# Patient Record
Sex: FEMALE | Race: WHITE | NOT HISPANIC OR LATINO | Employment: FULL TIME | URBAN - METROPOLITAN AREA
[De-identification: names, ages, dates, MRNs, and addresses within clinical notes are randomized per-mention and may not be internally consistent; named-entity substitution may affect disease eponyms.]

---

## 2019-06-17 ENCOUNTER — HOSPITAL PATHOLOGY (OUTPATIENT)
Dept: OTHER | Facility: CLINIC | Age: 44
End: 2019-06-17

## 2019-06-18 LAB — COPATH REPORT: NORMAL

## 2020-12-01 ENCOUNTER — TRANSFERRED RECORDS (OUTPATIENT)
Dept: HEALTH INFORMATION MANAGEMENT | Facility: CLINIC | Age: 45
End: 2020-12-01

## 2020-12-01 LAB — PHQ9 SCORE: 0

## 2021-01-08 ENCOUNTER — VIRTUAL VISIT (OUTPATIENT)
Dept: OTOLARYNGOLOGY | Facility: OTHER | Age: 46
End: 2021-01-08
Attending: NURSE PRACTITIONER
Payer: COMMERCIAL

## 2021-01-08 ENCOUNTER — TELEPHONE (OUTPATIENT)
Dept: ALLERGY | Facility: OTHER | Age: 46
End: 2021-01-08

## 2021-01-08 DIAGNOSIS — J30.89 PERENNIAL ALLERGIC RHINITIS: Primary | ICD-10-CM

## 2021-01-08 DIAGNOSIS — J30.81 ALLERGIC RHINITIS DUE TO ANIMAL (CAT) (DOG) HAIR AND DANDER: ICD-10-CM

## 2021-01-08 PROCEDURE — 99213 OFFICE O/P EST LOW 20 MIN: CPT | Mod: TEL | Performed by: NURSE PRACTITIONER

## 2021-01-08 RX ORDER — MONTELUKAST SODIUM 10 MG/1
10 TABLET ORAL AT BEDTIME
COMMUNITY
End: 2023-02-13

## 2021-01-08 RX ORDER — DIMENHYDRINATE 50 MG
1 TABLET ORAL DAILY
COMMUNITY

## 2021-01-08 RX ORDER — ESCITALOPRAM OXALATE 20 MG/1
20 TABLET ORAL DAILY
COMMUNITY

## 2021-01-08 RX ORDER — LACTOBACILLUS RHAMNOSUS GG 10B CELL
1 CAPSULE ORAL 3 TIMES DAILY
COMMUNITY

## 2021-01-08 RX ORDER — CHLORAL HYDRATE 500 MG
2 CAPSULE ORAL 2 TIMES DAILY
COMMUNITY

## 2021-01-08 RX ORDER — MULTIVITAMIN,THERAPEUTIC
1 TABLET ORAL DAILY
COMMUNITY

## 2021-01-08 RX ORDER — LEVOTHYROXINE SODIUM 25 UG/1
25 TABLET ORAL DAILY
COMMUNITY

## 2021-01-08 NOTE — TELEPHONE ENCOUNTER
Went over instructions with patient for allergy skin testing.  Reviewed patients current medications and patient will avoid all contraindicated medications prior to MQT testing.  Patient verbalizes understanding.  Copy of allergy testing packet will be mailed to patient.  Test and follow-up are scheduled, and patient is notified to call [facility] with any questions prior to testing.     Mona Dumont RN

## 2021-01-08 NOTE — LETTER
1/8/2021         RE: Carlee Jones  [address]  [place] [address]        Dear Colleague,    Thank you for referring your patient, Carlee Jones, to the [facility]. Please see a copy of my visit note below.    Carlee is a 45 year old who is being evaluated via a billable telephone visit.      What phone number would you like to be contacted at? [phone]    How would you like to obtain your AVS? MyChart       Otolaryngology Note         Chief Complaint:     Patient presents with:  Allergy Consult           History of Present Illness:     Carlee is a 45 year old female who I am seeing via telephone visit for allergy consult.     She reports she completed allergy testing many years ago, [facility] was completed [facility]. She was allergic to cats and dogs.  She recently got 2 cats and has noticed an increase in nasal congestion, PND, wet cough.  These [place]   She has been using allergy montelukast daily for allergy.  She has not been taking any additional AH.  She tries to keep cats out of the bedroom but does state that they do cuddle with her  on the bed at times.      She has occasional sinus headache when she feels congested.  No recent sinusisits.      She reports is able to breath through nose.   She reports is able to smell and taste.  + liset PND.  No dysphagia.  No fevers or chills.  Appetite is normal. She reports occasional shortness of breath with increased congestion.  She also does frequent throat clearing.  She just ordered netti pot.  She will start using that twice per day.      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 There is no water or  mold.  There is carpet in the bedroom.    Heat source in home: forced air and wood heat  Pets: 2 cats, currently looking for a dog          Medications:     Current Outpatient Rx   Medication Sig Dispense Refill     cholecalciferol (VITAMIN D3) 125 mcg (5000 units) capsule Take by mouth daily       escitalopram (LEXAPRO) 20 MG tablet Take 20 mg by mouth daily       fish oil-omega-3 fatty acids 1000 MG capsule Take 2 g by mouth 2 times daily       Flaxseed, Linseed, (FLAX SEED OIL) 1000 MG capsule Take 1 capsule by mouth daily       lactobacillus rhamnosus, GG, (CULTURELL) capsule Take 1 capsule by mouth 2 times daily       levothyroxine (SYNTHROID/LEVOTHROID) 25 MCG tablet Take 25 mcg by mouth daily       montelukast (SINGULAIR) 10 MG tablet Take 10 mg by mouth At Bedtime       multivitamin, therapeutic (THERA-VIT) TABS tablet Take 1 tablet by mouth daily       omeprazole (PRILOSEC) 20 MG DR capsule Take 20 mg by mouth every other day              Allergies:     Allergies: Cats, Dogs, Neosporin [neomycin-polymyx-gramicid], Penicillins, and Sulfa drugs          Past Medical History:     No past medical history on file.         Past Surgical History:     No past surgical history on file.    ENT family history reviewed         Social History:     Social History     Tobacco Use     Smoking status: Not on file   Substance Use Topics     Alcohol use: Not on file     Drug use: Not on file            Review of Systems:     ROS: See HPI         Physical Exam:     General - The patient is alert and oriented to person and place, answers questions and cooperates with telephone appointment  appropriately.   Voice and Breathing - The patient was talking in full sentences without audible signs of shortness of breath.  There was no audible wheezing, stridor, or stertor.  The patients voice was clear and strong, and had appropriate pitch and quality.           Assessment and Plan:       ICD-10-CM    1. Perennial allergic rhinitis   J30.89    2. Allergic rhinitis due to animal (cat) (dog) hair and dander  J30.81     Personal history per previous testing     Start saline rinses (netti pot) 1-2 times per day  Consider starting daily antihistamine (claritn, zyrtec, allegra or generic equivalent) this will have to be stopped 7 days prior to testing  Continue singulair  Follow up for allergy testing, I will see you after testing.      Indications for allergy testing include:    1) Confirm suspicion of allergic rhinitis due to inhalant allergies  2) Identify the offending allergen to determine specific mode of treatment  3) In the case of chronic rhinosinusitis: when symptoms are not controlled by avoidance and pharmacotherapy  4) In the Asthma patient when exacerbations may be due to perennial allergen exposure  5) Suspect food allergy  6) Otitis Media, chronic rhinitis, atopic dermatitis, Meniere disease, headache, pharyngitis or eye symptoms      Modified quantitative testing (MQT) will be performed.  Signed consent was obtained, and the risks of immunotherapy were discussed, including the potential for anaphylaxis.     If immunotherapy (IT) is recommended, there is continued risk of anaphylaxis.   Anaphylaxis can cause death. The patient will need to be monitored for 30 minutes post injection.  They must present their epinephrine pen prior to injection.  Subcutaneous as well as sublingual immunotherapy (SLIT) were discussed as potential treatment options.  The patient was told SLIT is not approved by the FDA and is cash pay.  The general time frame of immunotherapy was discussed (generally 3-5 years, sometimes longer), and the basic immunology behind IT was discussed.      Trisha CHEEK  [facility]        Phone call duration: 15 minutes      Again, thank you for allowing me to participate in the care of your patient.        Sincerely,        Trisha Francisco NP

## 2021-01-08 NOTE — PATIENT INSTRUCTIONS
Start saline rinses (netti pot) 1-2 times per day  Consider starting daily antihistamine (claritn, zyrtec, allegra or generic equivalent) this will have to be stopped 7 days prior to testing  Continue singulair  Follow up for allergy testing      Thank you for allowing Trisha CHEEK and our ENT team to participate in your care.  If your medications are too expensive, please call my nurse at the number listed below.  We can possibly change this medication.    If you have a scheduling or an appointment question please contact our Health Unit Coordinator at their direct line [phone].   ALL nursing questions or concerns can be directed to your ENT nurses at: [phone] or [phone].  '

## 2021-01-08 NOTE — PROGRESS NOTES
Carlee is a 45 year old who is being evaluated via a billable telephone visit.      What phone number would you like to be contacted at? [phone]    How would you like to obtain your AVS? [facility]       Otolaryngology Note         Chief Complaint:     Patient presents with:  Allergy Consult           History of Present Illness:     Carlee is a 45 year old female who I am seeing via telephone visit for allergy consult.     She reports she completed allergy testing many years ago, testing was completed at [facility]. She was allergic to cats and dogs.  She recently got 2 cats and has noticed an increase in nasal congestion, PND, wet cough.  These [place]   She has been using allergy montelukast daily for allergy.  She has not been taking any additional AH.  She tries to keep cats out of the bedroom but does state that they do cuddle with her  on the bed at times.      She has occasional sinus headache when she feels congested.  No recent sinusisits.      She reports is able to breath through nose.   She reports is able to smell and taste.  + liset PND.  No dysphagia.  No fevers or chills.  Appetite is normal. She reports occasional shortness of breath with increased congestion.  She also does frequent throat clearing.  She just ordered netti pot.  She will start using that twice per day.      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 There is no water or mold.  There is carpet in the bedroom.    Heat source in home: forced air and wood heat  Pets: 2 cats, currently looking for a dog          Medications:     Current Outpatient Rx   Medication Sig Dispense Refill     cholecalciferol (VITAMIN D3)  125 mcg (5000 units) capsule Take by mouth daily       escitalopram (LEXAPRO) 20 MG tablet Take 20 mg by mouth daily       fish oil-omega-3 fatty acids 1000 MG capsule Take 2 g by mouth 2 times daily       Flaxseed, Linseed, (FLAX SEED OIL) 1000 MG capsule Take 1 capsule by mouth daily       lactobacillus rhamnosus, GG, (CULTURELL) capsule Take 1 capsule by mouth 2 times daily       levothyroxine (SYNTHROID/LEVOTHROID) 25 MCG tablet Take 25 mcg by mouth daily       montelukast (SINGULAIR) 10 MG tablet Take 10 mg by mouth At Bedtime       multivitamin, therapeutic (THERA-VIT) TABS tablet Take 1 tablet by mouth daily       omeprazole (PRILOSEC) 20 MG DR capsule Take 20 mg by mouth every other day              Allergies:     Allergies: Cats, Dogs, Neosporin [neomycin-polymyx-gramicid], Penicillins, and Sulfa drugs          Past Medical History:     No past medical history on file.         Past Surgical History:     No past surgical history on file.    ENT family history reviewed         Social History:     Social History     Tobacco Use     Smoking status: Not on file   Substance Use Topics     Alcohol use: Not on file     Drug use: Not on file            Review of Systems:     ROS: See HPI         Physical Exam:     General - The patient is alert and oriented to person and place, answers questions and cooperates with telephone appointment  appropriately.   Voice and Breathing - The patient was talking in full sentences without audible signs of shortness of breath.  There was no audible wheezing, stridor, or stertor.  The patients voice was clear and strong, and had appropriate pitch and quality.           Assessment and Plan:       ICD-10-CM    1. Perennial allergic rhinitis  J30.89    2. Allergic rhinitis due to animal (cat) (dog) hair and dander  J30.81     Personal history per previous testing     Start saline rinses (netti pot) 1-2 times per day  Consider starting daily antihistamine (claritn, zyrtec, allegra or  generic equivalent) this will have to be stopped 7 days prior to testing  Continue singulair  Follow up for allergy testing, I will see you after testing.      Indications for allergy testing include:    1) Confirm suspicion of allergic rhinitis due to inhalant allergies  2) Identify the offending allergen to determine specific mode of treatment  3) In the case of chronic rhinosinusitis: when symptoms are not controlled by avoidance and pharmacotherapy  4) In the Asthma patient when exacerbations may be due to perennial allergen exposure  5) Suspect food allergy  6) Otitis Media, chronic rhinitis, atopic dermatitis, Meniere disease, headache, pharyngitis or eye symptoms      Modified quantitative testing (MQT) will be performed.  Signed consent was obtained, and the risks of immunotherapy were discussed, including the potential for anaphylaxis.     If immunotherapy (IT) is recommended, there is continued risk of anaphylaxis.   Anaphylaxis can cause death. The patient will need to be monitored for 30 minutes post injection.  They must present their epinephrine pen prior to injection.  Subcutaneous as well as sublingual immunotherapy (SLIT) were discussed as potential treatment options.  The patient was told SLIT is not approved by the FDA and is cash pay.  The general time frame of immunotherapy was discussed (generally 3-5 years, sometimes longer), and the basic immunology behind IT was discussed.      Trisha CHEEK  [facility]        Phone call duration: 15 minutes

## 2021-02-09 ENCOUNTER — OFFICE VISIT (OUTPATIENT)
Dept: OTOLARYNGOLOGY | Facility: OTHER | Age: 46
End: 2021-02-09
Attending: NURSE PRACTITIONER
Payer: COMMERCIAL

## 2021-02-09 ENCOUNTER — TELEPHONE (OUTPATIENT)
Dept: ALLERGY | Facility: OTHER | Age: 46
End: 2021-02-09

## 2021-02-09 ENCOUNTER — OFFICE VISIT (OUTPATIENT)
Dept: ALLERGY | Facility: OTHER | Age: 46
End: 2021-02-09
Attending: NURSE PRACTITIONER
Payer: COMMERCIAL

## 2021-02-09 VITALS
DIASTOLIC BLOOD PRESSURE: 65 MMHG | SYSTOLIC BLOOD PRESSURE: 116 MMHG | HEIGHT: 61 IN | HEART RATE: 72 BPM | BODY MASS INDEX: 40.59 KG/M2 | WEIGHT: 215 LBS | TEMPERATURE: 97.8 F | OXYGEN SATURATION: 97 %

## 2021-02-09 DIAGNOSIS — J30.89 PERENNIAL ALLERGIC RHINITIS: Primary | ICD-10-CM

## 2021-02-09 PROCEDURE — 95004 PERQ TESTS W/ALRGNC XTRCS: CPT

## 2021-02-09 PROCEDURE — 95024 IQ TESTS W/ALLERGENIC XTRCS: CPT

## 2021-02-09 PROCEDURE — 99212 OFFICE O/P EST SF 10 MIN: CPT | Mod: 25 | Performed by: NURSE PRACTITIONER

## 2021-02-09 RX ORDER — EPINEPHRINE 0.3 MG/.3ML
0.3 INJECTION SUBCUTANEOUS
Qty: 2 EACH | Refills: 11 | Status: SHIPPED | OUTPATIENT
Start: 2021-02-09

## 2021-02-09 RX ORDER — LEVOCETIRIZINE DIHYDROCHLORIDE 5 MG/1
5 TABLET, FILM COATED ORAL EVERY EVENING
Qty: 90 TABLET | Refills: 3 | Status: SHIPPED | OUTPATIENT
Start: 2021-02-09 | End: 2023-02-13 | Stop reason: ALTCHOICE

## 2021-02-09 ASSESSMENT — PAIN SCALES - GENERAL: PAINLEVEL: NO PAIN (0)

## 2021-02-09 ASSESSMENT — MIFFLIN-ST. JEOR: SCORE: 1547.61

## 2021-02-09 NOTE — PATIENT INSTRUCTIONS
Start allergy shots  The allergy nurses will call you to schedule your safety vial test  Bring your epi pen in with you for each injection and keep with you at all times  Start xyzal daily  You can try stopping the singulair, if symptoms worsen start taking again    Follow up in 6 months for recheck      Thank you for allowing Trisha CHEEK and our ENT team to participate in your care.  If your medications are too expensive, please call my nurse at the number listed below.  We can possibly change this medication.    If you have a scheduling or an appointment question please contact our Health Unit Coordinator at their direct line [phone].   ALL nursing questions or concerns can be directed to your ENT nurses at: [phone] or [phone].

## 2021-02-09 NOTE — PROGRESS NOTES
Carlee was seen for allergy skin testing. Patient was seen by this nurse in conjunction with ENT provider. All encounter details are documented in ENT Provider's appointment from this same date. Please see referenced encounter for this visits documentation.   Mona Dumont RN

## 2021-02-09 NOTE — LETTER
2/9/2021         RE: Carlee Jones  [address]  [place] [address]        Dear Colleague,    Thank you for referring your patient, Carlee Jones, to the [facility]. Please see a copy of my visit note below.    Otolaryngology Note         Chief Complaint:     Patient presents with:  Allergies: MQT           History of Present Illness:     Carlee Jones is a 45 year old female seen today for follow up MQT.      She did well with allergy testing.  No oral swelling, excessive itching, or wheezing.      MQT completed 2/9/21:  Dilution 6 (high) none  Dilution 5 (moderate) cat  Dilution 2 (low) ragweed, pigweed, thistle, grass, birch, maple, elm, oak, mirian, pine, walnut, alternaria, aspergillus, hormodendrum, Epicoccum, fusarium, Helminthosporium, dog, dust    She interested in SCIT         Medications:     Current Outpatient Rx   Medication Sig Dispense Refill     cholecalciferol (VITAMIN D3) 125 mcg (5000 units) capsule Take by mouth daily       EPINEPHrine (ANY BX GENERIC EQUIV) 0.3 MG/0.3ML injection 2-pack Inject 0.3 mLs (0.3 mg) into the muscle once as needed 2 each 11     escitalopram (LEXAPRO) 20 MG tablet Take 20 mg by mouth daily       fish oil-omega-3 fatty acids 1000 MG capsule Take 2 g by mouth 2 times daily       Flaxseed, Linseed, (FLAX SEED OIL) 1000 MG capsule Take 1 capsule by mouth daily       lactobacillus rhamnosus, GG, (CULTURELL) capsule Take 1 capsule by mouth 3 times daily        levothyroxine (SYNTHROID/LEVOTHROID) 25 MCG tablet Take 25 mcg by mouth daily       montelukast (SINGULAIR) 10 MG tablet Take 10 mg by mouth At Bedtime       multivitamin, therapeutic (THERA-VIT) TABS tablet Take 1 tablet by mouth daily              Allergies:     Allergies: Cats, Dog epithelium, Gramicidin, Neomycin, Neosporin [neomycin-polymyx-gramicid], Penicillins, Polymyxin b, and Sulfa drugs          Past Medical History:     History reviewed. No pertinent past medical history.       "   Past Surgical History:     Past Surgical History:   Procedure Laterality Date     HYSTERECTOMY  2019       ENT family history reviewed         Social History:     Social History     Tobacco Use     Smoking status: Former Smoker     Packs/day: 2.00     Types: Cigarettes     Start date: 1986     Quit date: 8/3/1998     Years since quittin.5     Smokeless tobacco: Never Used   Substance Use Topics     Alcohol use: Yes     Comment: social     Drug use: Never            Review of Systems:     ROS: See HPI         Physical Exam:     /65 (BP Location: Right arm, Patient Position: Chair, Cuff Size: Adult Large)   Pulse 72   Temp 97.8  F (36.6  C) (Oral)   Ht 1.549 m (5' 1\")   Wt 97.5 kg (215 lb)   SpO2 97%   BMI 40.62 kg/m      General - The patient is well nourished and well developed, and appears to have good nutritional status.  Alert and oriented to person and place, answers questions and cooperates with examination appropriately.   Head and Face - Normocephalic and atraumatic, with no gross asymmetry noted.  The facial nerve is intact, with strong symmetric movements.  Voice and Breathing - The patient was breathing comfortably without the use of accessory muscles. There was no wheezing, stridor. The patients voice was clear and strong, and had appropriate pitch and quality.  Ears - External ear normal. Canals are patent. Right tympanic membrane is intact without effusion, retraction or mass. Left tympanic membrane is intact without effusion, retraction or mass.  Eyes - Extraocular movements intact, sclera were not icteric or injected, conjunctiva were pink and moist.  Mouth - Examination of the oral cavity showed pink, healthy oral mucosa. Dentition in good condition. No lesions or ulcerations noted. The tongue was mobile and midline.   Throat - The walls of the oropharynx were smooth, pink, moist, symmetric, and had no lesions or ulcerations.  The tonsillar pillars and soft palate were " symmetric. The uvula was midline on elevation.    Neck - Normal midline excursion of the laryngotracheal complex during swallowing.  Full range of motion on passive movement.  Palpation of the occipital, submental, submandibular, internal jugular chain, and supraclavicular nodes did not demonstrate any abnormal lymph nodes or masses.  Palpation of the thyroid was soft and smooth, with no nodules or goiter appreciated.  The trachea was mobile and midline.  Nose - External contour is symmetric, no gross deflection or scars.  Nasal mucosa is pink and moist with no abnormal mucus.  The septum and turbinates were evaluated with nasal speculum, no polyps, masses, or purulence noted on examination.         Assessment and Plan:       ICD-10-CM    1. Perennial allergic rhinitis  J30.89 EPINEPHrine (ANY BX GENERIC EQUIV) 0.3 MG/0.3ML injection 2-pack     Start allergy shots  The allergy nurses will call you to schedule your safety vial test  Bring your epi pen in with you for each injection and keep with you at all times  Start xyzal daily  You can try stopping the singulair, if symptoms worsen start taking again    Follow up in 6 months for recheck, if immunotherapy is going well and no liset concerns, can do virtual visit.          Again, thank you for allowing me to participate in the care of your patient.        Sincerely,        Trisha Francisco NP

## 2021-02-09 NOTE — NURSING NOTE
"Chief Complaint   Patient presents with     Allergies     MQT       Initial /65 (BP Location: Right arm, Patient Position: Chair, Cuff Size: Adult Large)   Pulse 72   Temp 97.8  F (36.6  C) (Oral)   Ht 1.549 m (5' 1\")   Wt 97.5 kg (215 lb)   SpO2 97%   BMI 40.62 kg/m   Estimated body mass index is 40.62 kg/m  as calculated from the following:    Height as of this encounter: 1.549 m (5' 1\").    Weight as of this encounter: 97.5 kg (215 lb).  Medication Reconciliation: complete  Mona Dumont RN    This patient presents today for allergy skin testing.      Symptoms have included nasal congestion, sneezing, runny nose, a wet cough, and PND.  She has occasional itchy, watery eyes.  She rarely gets sinus infections.  No skin issues and no history of asthma.  She had her tonsils removed when she was 12, which she notes was very helpful as she had frequent strep throat.  She notes bad ear infections in the past, but states it has been about 10 years since her last ear infection.  No tubes.  Symptoms are worse in the fall season.  She occasionally uses a netti pot, which she states is helpful.      This patient lives in a single family home, with a basement.  This patient does not suspect mold, water or moisture issues in the home.  There is carpet in the home, and carpet in the bedroom.  Home has wood stove and propane forced air heat and does not have air conditioning.        This patient has 2 cats for pets.  They are both inside.  They do sleep in bed with the patient.    This patient has had allergy testing in the past.  She was tested some years back in [place].  She recalls positives to cats and dogs.  No past IT.    This patient's medications have been reviewed prior to testing and all appropriate medications have been stopped.    Consent is signed by patient and signature is verified.     MQT/ID test is performed per protocol.  The patient tolerated testing well.  Benadryl gel was applied to " testing sites on patient's skin, and patient received Claritin 10 mg chewable tablets, both per standing order for post test itch.  All findings are recorded on the paper flow sheet. Results are reviewed with this patient.  They are given written information regarding allergy.       The patient will follow-up with Trisha Francisco CNP, for treatment plan.      Mona Dumont RN

## 2021-02-09 NOTE — PROGRESS NOTES
Otolaryngology Note         Chief Complaint:     Patient presents with:  Allergies: MQT           History of Present Illness:     Carlee Jones is a 45 year old female seen today for follow up MQT.      She did well with allergy testing.  No oral swelling, excessive itching, or wheezing.      MQT completed 2/9/21:  Dilution 6 (high) none  Dilution 5 (moderate) cat  Dilution 2 (low) ragweed, pigweed, thistle, grass, birch, maple, elm, oak, mirian, pine, walnut, alternaria, aspergillus, hormodendrum, Epicoccum, fusarium, Helminthosporium, dog, dust    She interested in SCIT         Medications:     Current Outpatient Rx   Medication Sig Dispense Refill     cholecalciferol (VITAMIN D3) 125 mcg (5000 units) capsule Take by mouth daily       EPINEPHrine (ANY BX GENERIC EQUIV) 0.3 MG/0.3ML injection 2-pack Inject 0.3 mLs (0.3 mg) into the muscle once as needed 2 each 11     escitalopram (LEXAPRO) 20 MG tablet Take 20 mg by mouth daily       fish oil-omega-3 fatty acids 1000 MG capsule Take 2 g by mouth 2 times daily       Flaxseed, Linseed, (FLAX SEED OIL) 1000 MG capsule Take 1 capsule by mouth daily       lactobacillus rhamnosus, GG, (CULTURELL) capsule Take 1 capsule by mouth 3 times daily        levothyroxine (SYNTHROID/LEVOTHROID) 25 MCG tablet Take 25 mcg by mouth daily       montelukast (SINGULAIR) 10 MG tablet Take 10 mg by mouth At Bedtime       multivitamin, therapeutic (THERA-VIT) TABS tablet Take 1 tablet by mouth daily              Allergies:     Allergies: Cats, Dog epithelium, Gramicidin, Neomycin, Neosporin [neomycin-polymyx-gramicid], Penicillins, Polymyxin b, and Sulfa drugs          Past Medical History:     History reviewed. No pertinent past medical history.         Past Surgical History:     Past Surgical History:   Procedure Laterality Date     HYSTERECTOMY  06/2019       ENT family history reviewed         Social History:     Social History     Tobacco Use     Smoking status: Former Smoker      "Packs/day: 2.00     Types: Cigarettes     Start date: 1986     Quit date: 8/3/1998     Years since quittin.5     Smokeless tobacco: Never Used   Substance Use Topics     Alcohol use: Yes     Comment: social     Drug use: Never            Review of Systems:     ROS: See HPI         Physical Exam:     /65 (BP Location: Right arm, Patient Position: Chair, Cuff Size: Adult Large)   Pulse 72   Temp 97.8  F (36.6  C) (Oral)   Ht 1.549 m (5' 1\")   Wt 97.5 kg (215 lb)   SpO2 97%   BMI 40.62 kg/m      General - The patient is well nourished and well developed, and appears to have good nutritional status.  Alert and oriented to person and place, answers questions and cooperates with examination appropriately.   Head and Face - Normocephalic and atraumatic, with no gross asymmetry noted.  The facial nerve is intact, with strong symmetric movements.  Voice and Breathing - The patient was breathing comfortably without the use of accessory muscles. There was no wheezing, stridor. The patients voice was clear and strong, and had appropriate pitch and quality.  Ears - External ear normal. Canals are patent. Right tympanic membrane is intact without effusion, retraction or mass. Left tympanic membrane is intact without effusion, retraction or mass.  Eyes - Extraocular movements intact, sclera were not icteric or injected, conjunctiva were pink and moist.  Mouth - Examination of the oral cavity showed pink, healthy oral mucosa. Dentition in good condition. No lesions or ulcerations noted. The tongue was mobile and midline.   Throat - The walls of the oropharynx were smooth, pink, moist, symmetric, and had no lesions or ulcerations.  The tonsillar pillars and soft palate were symmetric. The uvula was midline on elevation.    Neck - Normal midline excursion of the laryngotracheal complex during swallowing.  Full range of motion on passive movement.  Palpation of the occipital, submental, submandibular, internal " jugular chain, and supraclavicular nodes did not demonstrate any abnormal lymph nodes or masses.  Palpation of the thyroid was soft and smooth, with no nodules or goiter appreciated.  The trachea was mobile and midline.  Nose - External contour is symmetric, no gross deflection or scars.  Nasal mucosa is pink and moist with no abnormal mucus.  The septum and turbinates were evaluated with nasal speculum, no polyps, masses, or purulence noted on examination.         Assessment and Plan:       ICD-10-CM    1. Perennial allergic rhinitis  J30.89 EPINEPHrine (ANY BX GENERIC EQUIV) 0.3 MG/0.3ML injection 2-pack     Start allergy shots  The allergy nurses will call you to schedule your safety vial test  Bring your epi pen in with you for each injection and keep with you at all times  Start xyzal daily  You can try stopping the singulair, if symptoms worsen start taking again    Follow up in 6 months for recheck, if immunotherapy is going well and no liset concerns, can do virtual visit.

## 2021-02-24 ENCOUNTER — ALLIED HEALTH/NURSE VISIT (OUTPATIENT)
Dept: ALLERGY | Facility: OTHER | Age: 46
End: 2021-02-24
Attending: NURSE PRACTITIONER
Payer: COMMERCIAL

## 2021-02-24 DIAGNOSIS — J30.89 PERENNIAL ALLERGIC RHINITIS: Primary | ICD-10-CM

## 2021-02-24 PROCEDURE — 95165 ANTIGEN THERAPY SERVICES: CPT | Performed by: NURSE PRACTITIONER

## 2021-02-24 NOTE — PROGRESS NOTES
Allergy serum is mixed today at schedule Silver 1 of  4,  into  1  (5 ml)  multi dose vial/vials.    Allergens included were:    Ragweed  0.2 ml of dilution # 2  Pigweed  0.2 ml of dilution # 2  Mugwort 0.2 ml of dilution # 0  Kochia  0.2 ml of dilution # 0  Russian Thistle 0.2 ml of dilution # 2  Que Grass 0.2 ml of dilution # 2  Birch mix 0.2 ml of dilution # 2  Maple Mix 0.2 of dilution # 2  Elm Mix 0.2 ml of dilution # 2  Oak Mix 0.2 ml of dilution # 2  Moe Mix 0.2 ml of dilution # 2  Pine Mix 0.2 ml of dilution # 2  Eastern [place] 0.2 ml of dilution # 0  Black [place] 0.2 ml of dilution # 2  Aspen 0.2 ml of dilution # 0  Red [place] 0.2 ml of dilution # 0    Alternaria 0.2 ml of dilution # 2  Aspergillus 0.2 ml of dilution # 2  Hormodendrum 0.2 ml of dilution # 2  Helminthosporium 0.2 ml of dilution # 2  Penicillium 0.2 ml of dilution # 0  Epicoccum 0.2 ml of dilution # 2  Fusarium 0.2 ml of dilution # 2  Mucor 0.2 ml of dilution # 0  Grain Smut 0.2 ml of dilution # 0  Grass Smut 0.2 ml of dilution # 0  Cat 0.2 ml of dilution # 4  Dog 0.2 ml of dilution # 2  Feather Mix 0.2 ml of dilution # 0  Dust Mite Mix 0.2 ml of dilution # 2  Horse 0.2 ml of dilution # 0    Hernando Vanegas RN on 2/24/2021 at 10:34 AM

## 2021-03-04 ENCOUNTER — OFFICE VISIT (OUTPATIENT)
Dept: ALLERGY | Facility: OTHER | Age: 46
End: 2021-03-04
Attending: NURSE PRACTITIONER
Payer: COMMERCIAL

## 2021-03-04 DIAGNOSIS — J30.89 PERENNIAL ALLERGIC RHINITIS: Primary | ICD-10-CM

## 2021-03-04 PROCEDURE — 95115 IMMUNOTHERAPY ONE INJECTION: CPT

## 2021-05-17 ENCOUNTER — TELEPHONE (OUTPATIENT)
Dept: OTOLARYNGOLOGY | Facility: OTHER | Age: 46
End: 2021-05-17

## 2021-05-26 ENCOUNTER — ALLIED HEALTH/NURSE VISIT (OUTPATIENT)
Dept: ALLERGY | Facility: OTHER | Age: 46
End: 2021-05-26
Attending: NURSE PRACTITIONER
Payer: COMMERCIAL

## 2021-05-26 DIAGNOSIS — J30.89 PERENNIAL ALLERGIC RHINITIS: Primary | ICD-10-CM

## 2021-05-26 PROCEDURE — 95165 ANTIGEN THERAPY SERVICES: CPT | Performed by: NURSE PRACTITIONER

## 2021-05-26 NOTE — PROGRESS NOTES
Allergy serum is mixed today at Gold schedule 2 of 4, into one (5 ml) multi dose vial/vials.    Allergens included were:    Ragweed  0.2 ml of dilution # 1  Pigweed  0.2 ml of dilution # 1  Mugwort 0.2 ml of dilution # 0  Kochia  0.2 ml of dilution # 0  Russian Thistle 0.2 ml of dilution # 1  Que Grass 0.2 ml of dilution # 1  Birch mix 0.2 ml of dilution # 1  Maple Mix 0.2 of dilution # 1  Elm Mix 0.2 ml of dilution # 1  Oak Mix 0.2 ml of dilution # 1  Moe Mix 0.2 ml of dilution # 1  Pine Mix 0.2 ml of dilution # 1  Eastern [place] 0.2 ml of dilution # 0  Black [place] 0.2 ml of dilution # 1  Aspen 0.2 ml of dilution # 0  Red [place] 0.2 ml of dilution # 0    Alternaria 0.2 ml of dilution # 1  Aspergillus 0.2 ml of dilution # 1  Hormodendrum 0.2 ml of dilution # 1  Helminthosporium 0.2 ml of dilution # 1  Penicillium 0.2 ml of dilution # 0  Epicoccum 0.2 ml of dilution # 1  Fusarium 0.2 ml of dilution # 1  Mucor 0.2 ml of dilution # 0  Grain Smut 0.2 ml of dilution # 0  Grass Smut 0.2 ml of dilution # 0  Cat 0.2 ml of dilution # 3  Dog 0.2 ml of dilution # 1  Feather Mix 0.2 ml of dilution # 0  Dust Mite Mix 0.2 ml of dilution # 1  Horse 0.2 ml of dilution # 0    Mona Dumont RN

## 2021-06-04 ENCOUNTER — OFFICE VISIT (OUTPATIENT)
Dept: ALLERGY | Facility: OTHER | Age: 46
End: 2021-06-04
Attending: NURSE PRACTITIONER
Payer: COMMERCIAL

## 2021-06-04 DIAGNOSIS — J30.89 PERENNIAL ALLERGIC RHINITIS: Primary | ICD-10-CM

## 2021-06-04 PROCEDURE — 95115 IMMUNOTHERAPY ONE INJECTION: CPT

## 2021-06-04 NOTE — PROGRESS NOTES
Prior to safety vial test, verified patient's identity using patient's name and date of birth.    Safety vial test was done in the right arm, for new Gold vial # 1.   Administered  0.01ml (ID) for SVT.  SVT = 7 mm.   Passed.    Allergy injection/s given and charted on paper allergy flow sheet.  Patient held 30 minutes for observation, no reaction noted.    Mona Dumont RN

## 2021-08-18 ENCOUNTER — ALLIED HEALTH/NURSE VISIT (OUTPATIENT)
Dept: ALLERGY | Facility: OTHER | Age: 46
End: 2021-08-18
Attending: PHYSICIAN ASSISTANT
Payer: COMMERCIAL

## 2021-08-18 ENCOUNTER — TRANSFERRED RECORDS (OUTPATIENT)
Dept: ALLERGY | Facility: OTHER | Age: 46
End: 2021-08-18

## 2021-08-18 DIAGNOSIS — J30.89 PERENNIAL ALLERGIC RHINITIS: Primary | ICD-10-CM

## 2021-08-18 PROCEDURE — 95165 ANTIGEN THERAPY SERVICES: CPT | Performed by: PHYSICIAN ASSISTANT

## 2021-08-18 NOTE — PROGRESS NOTES
Allergy serum is mixed today at schedule Blue 3 of 4,  into  1  (5 ml)  multi dose vial/vials.    Allergens included were:    Ragweed  0.2 ml of dilution # 1  Pigweed  0.2 ml of dilution # 1  Mugwort 0.2 ml of dilution # 0  Kochia  0.2 ml of dilution # 0  Russian Thistle 0.2 ml of dilution # 1  Que Grass 0.2 ml of dilution # 1  Birch mix 0.2 ml of dilution # 1  Maple Mix 0.2 of dilution # 1  Elm Mix 0.2 ml of dilution # 1  Oak Mix 0.2 ml of dilution # 1  Moe Mix 0.2 ml of dilution # 1  Pine Mix 0.2 ml of dilution # 1  Eastern [place] 0.2 ml of dilution # 0  Black [place] 0.2 ml of dilution # 1  Aspen 0.2 ml of dilution # 0  Red [place] 0.2 ml of dilution # 0    Alternaria 0.2 ml of dilution # 1  Aspergillus 0.2 ml of dilution # 1  Hormodendrum 0.2 ml of dilution # 1  Helminthosporium 0.2 ml of dilution # 1  Penicillium 0.2 ml of dilution # 0  Epicoccum 0.2 ml of dilution # 1  Fusarium 0.2 ml of dilution # 1  Mucor 0.2 ml of dilution # 0  Grain Smut 0.2 ml of dilution # 0  Grass Smut 0.2 ml of dilution # 0  Cat 0.2 ml of dilution # 2  Dog 0.2 ml of dilution # 1  Feather Mix 0.2 ml of dilution # 0  Dust Mite Mix 0.2 ml of dilution # 1  Horse 0.2 ml of dilution # 0    Hernando Vanegas RN on 8/18/2021 at 9:38 AM

## 2021-08-25 PROBLEM — J34.2 DEVIATED NASAL SEPTUM: Status: ACTIVE | Noted: 2019-01-04

## 2021-08-25 PROBLEM — R00.2 PALPITATIONS: Status: ACTIVE | Noted: 2019-01-04

## 2021-08-25 PROBLEM — M67.40 GANGLION: Status: ACTIVE | Noted: 2019-01-04

## 2021-08-25 PROBLEM — G47.30 SLEEP APNEA: Status: ACTIVE | Noted: 2019-01-04

## 2021-08-25 PROBLEM — D23.9 BENIGN NEOPLASM OF SKIN: Status: ACTIVE | Noted: 2019-01-04

## 2021-08-25 PROBLEM — M21.619 BUNION: Status: ACTIVE | Noted: 2019-01-04

## 2021-08-25 PROBLEM — K64.9 HEMORRHOIDS, UNSPECIFIED HEMORRHOID TYPE: Status: ACTIVE | Noted: 2019-01-04

## 2021-08-31 ENCOUNTER — OFFICE VISIT (OUTPATIENT)
Dept: OTOLARYNGOLOGY | Facility: OTHER | Age: 46
End: 2021-08-31
Attending: NURSE PRACTITIONER
Payer: COMMERCIAL

## 2021-08-31 ENCOUNTER — OFFICE VISIT (OUTPATIENT)
Dept: ALLERGY | Facility: OTHER | Age: 46
End: 2021-08-31
Attending: NURSE PRACTITIONER
Payer: COMMERCIAL

## 2021-08-31 VITALS
WEIGHT: 208 LBS | HEART RATE: 66 BPM | OXYGEN SATURATION: 97 % | TEMPERATURE: 98.6 F | BODY MASS INDEX: 39.27 KG/M2 | SYSTOLIC BLOOD PRESSURE: 110 MMHG | HEIGHT: 61 IN | DIASTOLIC BLOOD PRESSURE: 70 MMHG

## 2021-08-31 DIAGNOSIS — J30.89 PERENNIAL ALLERGIC RHINITIS: Primary | ICD-10-CM

## 2021-08-31 PROBLEM — E66.01 MORBID OBESITY (H): Status: ACTIVE | Noted: 2021-08-31

## 2021-08-31 PROCEDURE — 95115 IMMUNOTHERAPY ONE INJECTION: CPT

## 2021-08-31 PROCEDURE — 99213 OFFICE O/P EST LOW 20 MIN: CPT | Mod: 25 | Performed by: NURSE PRACTITIONER

## 2021-08-31 RX ORDER — CETIRIZINE HYDROCHLORIDE 10 MG/1
10 TABLET ORAL DAILY
COMMUNITY

## 2021-08-31 ASSESSMENT — PAIN SCALES - GENERAL: PAINLEVEL: NO PAIN (0)

## 2021-08-31 ASSESSMENT — MIFFLIN-ST. JEOR: SCORE: 1510.86

## 2021-08-31 NOTE — NURSING NOTE
"Chief Complaint   Patient presents with     Follow Up     6 month Perennial Allergic Rhinitis        Initial /70 (BP Location: Right arm, Patient Position: Chair, Cuff Size: Adult Large)   Pulse 66   Temp 98.6  F (37  C) (Oral)   Ht 1.549 m (5' 1\")   Wt 94.3 kg (208 lb)   SpO2 97%   BMI 39.30 kg/m   Estimated body mass index is 39.3 kg/m  as calculated from the following:    Height as of this encounter: 1.549 m (5' 1\").    Weight as of this encounter: 94.3 kg (208 lb).  Medication Reconciliation: complete  Mona Dumont RN    "

## 2021-08-31 NOTE — PROGRESS NOTES
Prior to safety vial test, verified patient's identity using patient's name and date of birth.    Safety vial test was done in the left arm, for new Blue vial # 1.   Administered  0.01ml (ID) for SVT.  SVT = 7 mm.   Passed.    Allergy injection/s given and charted on paper allergy flow sheet.  Patient held 30 minutes for observation, no reaction noted.    Mona Dumont RN

## 2021-08-31 NOTE — PROGRESS NOTES
Otolaryngology Note         Chief Complaint:     Patient presents with:  Follow Up: 6 month Perennial Allergic Rhinitis            History of Present Illness:     Carlee Jones is a 46 year old female seen today for a follow up for SCIT.  She is currently on the blue vial of build up.      Has been tolerating injections well  Has noticed improvement with nasal congestion and allergy symptoms.    Has been using zyrtec for symptomatic relief and has noticed relief since starting medication.      Epi pen is up to date    Does not use alice med sinus rinses consistently but has it if needed  No liset sinusitis  No recent illnesses or sinus infections    MQT completed 2/9/21:  Dilution 6 (high) none  Dilution 5 (moderate) cat  Dilution 2 (low) ragweed, pigweed, thistle, grass, birch, maple, elm, oak, mirian, pine, walnut, alternaria, aspergillus, hormodendrum, Epicoccum, fusarium, Helminthosporium, dog, dust         Medications:     Current Outpatient Rx   Medication Sig Dispense Refill     cetirizine (ZYRTEC) 10 MG tablet Take 10 mg by mouth daily       cholecalciferol (VITAMIN D3) 125 mcg (5000 units) capsule Take by mouth daily       escitalopram (LEXAPRO) 20 MG tablet Take 20 mg by mouth daily       fish oil-omega-3 fatty acids 1000 MG capsule Take 2 g by mouth 2 times daily       Flaxseed, Linseed, (FLAX SEED OIL) 1000 MG capsule Take 1 capsule by mouth daily       lactobacillus rhamnosus, GG, (CULTURELL) capsule Take 1 capsule by mouth 3 times daily        levothyroxine (SYNTHROID/LEVOTHROID) 25 MCG tablet Take 25 mcg by mouth daily       multivitamin, therapeutic (THERA-VIT) TABS tablet Take 1 tablet by mouth daily       ORDER FOR ALLERGEN IMMUNOTHERAPY Patient to start allergy shots.  Follow standard buildup protocols. 5 mL PRN     EPINEPHrine (ANY BX GENERIC EQUIV) 0.3 MG/0.3ML injection 2-pack Inject 0.3 mLs (0.3 mg) into the muscle once as needed (Patient not taking: Reported on 8/31/2021) 2 each 11      "levocetirizine (XYZAL) 5 MG tablet Take 1 tablet (5 mg) by mouth every evening (Patient not taking: Reported on 2021) 90 tablet 3     montelukast (SINGULAIR) 10 MG tablet Take 10 mg by mouth At Bedtime (Patient not taking: Reported on 2021)              Allergies:     Allergies: Cats, Dog epithelium, Gramicidin, Neomycin, Neosporin [neomycin-polymyx-gramicid], Penicillins, Polymyxin b, and Sulfa drugs          Past Medical History:     History reviewed. No pertinent past medical history.         Past Surgical History:     Past Surgical History:   Procedure Laterality Date     HERNIA REPAIR Right 2021     HYSTERECTOMY  2019       ENT family history reviewed         Social History:     Social History     Tobacco Use     Smoking status: Former Smoker     Packs/day: 2.00     Types: Cigarettes     Start date: 1986     Quit date: 8/3/1998     Years since quittin.0     Smokeless tobacco: Never Used   Substance Use Topics     Alcohol use: Yes     Comment: social     Drug use: Never            Review of Systems:     ROS: See HPI         Physical Exam:     /70 (BP Location: Right arm, Patient Position: Chair, Cuff Size: Adult Large)   Pulse 66   Temp 98.6  F (37  C) (Oral)   Ht 1.549 m (5' 1\")   Wt 94.3 kg (208 lb)   SpO2 97%   BMI 39.30 kg/m      General - The patient is well nourished and well developed, and appears to have good nutritional status.  Alert and oriented to person and place, answers questions and cooperates with examination appropriately.   Head and Face - Normocephalic and atraumatic, with no gross asymmetry noted.  The facial nerve is intact, with strong symmetric movements.  Voice and Breathing - The patient was breathing comfortably without the use of accessory muscles. There was no wheezing, stridor. The patients voice was clear and strong, and had appropriate pitch and quality.  Ears - External ear normal. Canals are patent. Right tympanic membrane is intact " without effusion, retraction or mass. Left tympanic membrane is intact without effusion, retraction or mass.  Eyes - Extraocular movements intact, and the pupils were reactive to light. Sclera were not icteric or injected, conjunctiva were pink and moist.  Mouth - Examination of the oral cavity showed pink, healthy oral mucosa. Dentition in good condition. No lesions or ulcerations noted. The tongue was mobile and midline.   Throat - The walls of the oropharynx were smooth, pink, moist, symmetric, and had no lesions or ulcerations.  The tonsillar pillars and soft palate were symmetric. The uvula was midline on elevation.    Neck - Normal midline excursion of the laryngotracheal complex during swallowing.  Full range of motion on passive movement.  Palpation of the occipital, submental, submandibular, internal jugular chain, and supraclavicular nodes did not demonstrate any abnormal lymph nodes or masses.  Palpation of the thyroid was soft and smooth, with no nodules or goiter appreciated.  The trachea was mobile and midline.  Nose - External contour is symmetric, no gross deflection or scars.  Nasal mucosa is pink and moist with no abnormal mucus.  The septum and turbinates were evaluated with nasal speculum, no polyps, masses, or purulence noted on examination of anterior nasal cavity.         Assessment and Plan:       ICD-10-CM    1. Perennial allergic rhinitis  J30.89

## 2021-08-31 NOTE — PATIENT INSTRUCTIONS
Continue weekly injections  Follow up in 12 months for recheck, sooner as needed with new or worsening symptoms  Call when you need a new epi pen.        Thank you for allowing Trisha CHEEK and our ENT team to participate in your care.  If your medications are too expensive, please call my nurse at the number listed below.  We can possibly change this medication.    If you have a scheduling or an appointment question please contact our Health Unit Coordinator at their direct line [phone]  ALL nursing questions or concerns can be directed to my Nurse Halley [phone].

## 2021-08-31 NOTE — LETTER
8/31/2021         RE: Carlee Jones  [address]  [place] [address]        Dear Colleague,    Thank you for referring your patient, Carlee Jones, to the [facility]. Please see a copy of my visit note below.    Otolaryngology Note         Chief Complaint:     Patient presents with:  Follow Up: 6 month Perennial Allergic Rhinitis            History of Present Illness:     Carlee Jones is a 46 year old female seen today for a follow up for SCIT.  She is currently on the blue vial of build up.      Has been tolerating injections well  Has noticed improvement with nasal congestion and allergy symptoms.    Has been using zyrtec for symptomatic relief and has noticed relief since starting medication.      Epi pen is up to date    Does not use alice med sinus rinses consistently but has it if needed  No liset sinusitis  No recent illnesses or sinus infections    MQT completed 2/9/21:  Dilution 6 (high) none  Dilution 5 (moderate) cat  Dilution 2 (low) ragweed, pigweed, thistle, grass, birch, maple, elm, oak, mirian, pine, walnut, alternaria, aspergillus, hormodendrum, Epicoccum, fusarium, Helminthosporium, dog, dust         Medications:     Current Outpatient Rx   Medication Sig Dispense Refill     cetirizine (ZYRTEC) 10 MG tablet Take 10 mg by mouth daily       cholecalciferol (VITAMIN D3) 125 mcg (5000 units) capsule Take by mouth daily       escitalopram (LEXAPRO) 20 MG tablet Take 20 mg by mouth daily       fish oil-omega-3 fatty acids 1000 MG capsule Take 2 g by mouth 2 times daily       Flaxseed, Linseed, (FLAX SEED OIL) 1000 MG capsule Take 1 capsule by mouth daily       lactobacillus rhamnosus, GG, (CULTURELL) capsule Take 1 capsule by mouth 3 times daily        levothyroxine (SYNTHROID/LEVOTHROID) 25 MCG tablet Take 25 mcg by mouth daily       multivitamin, therapeutic (THERA-VIT) TABS tablet Take 1 tablet by mouth daily       ORDER FOR ALLERGEN IMMUNOTHERAPY Patient to start  "allergy shots.  Follow standard buildup protocols. 5 mL PRN     EPINEPHrine (ANY BX GENERIC EQUIV) 0.3 MG/0.3ML injection 2-pack Inject 0.3 mLs (0.3 mg) into the muscle once as needed (Patient not taking: Reported on 2021) 2 each 11     levocetirizine (XYZAL) 5 MG tablet Take 1 tablet (5 mg) by mouth every evening (Patient not taking: Reported on 2021) 90 tablet 3     montelukast (SINGULAIR) 10 MG tablet Take 10 mg by mouth At Bedtime (Patient not taking: Reported on 2021)              Allergies:     Allergies: Cats, Dog epithelium, Gramicidin, Neomycin, Neosporin [neomycin-polymyx-gramicid], Penicillins, Polymyxin b, and Sulfa drugs          Past Medical History:     History reviewed. No pertinent past medical history.         Past Surgical History:     Past Surgical History:   Procedure Laterality Date     HERNIA REPAIR Right 2021     HYSTERECTOMY  2019       ENT family history reviewed         Social History:     Social History     Tobacco Use     Smoking status: Former Smoker     Packs/day: 2.00     Types: Cigarettes     Start date: 1986     Quit date: 8/3/1998     Years since quittin.0     Smokeless tobacco: Never Used   Substance Use Topics     Alcohol use: Yes     Comment: social     Drug use: Never            Review of Systems:     ROS: See HPI         Physical Exam:     /70 (BP Location: Right arm, Patient Position: Chair, Cuff Size: Adult Large)   Pulse 66   Temp 98.6  F (37  C) (Oral)   Ht 1.549 m (5' 1\")   Wt 94.3 kg (208 lb)   SpO2 97%   BMI 39.30 kg/m      General - The patient is well nourished and well developed, and appears to have good nutritional status.  Alert and oriented to person and place, answers questions and cooperates with examination appropriately.   Head and Face - Normocephalic and atraumatic, with no gross asymmetry noted.  The facial nerve is intact, with strong symmetric movements.  Voice and Breathing - The patient was breathing " comfortably without the use of accessory muscles. There was no wheezing, stridor. The patients voice was clear and strong, and had appropriate pitch and quality.  Ears - External ear normal. Canals are patent. Right tympanic membrane is intact without effusion, retraction or mass. Left tympanic membrane is intact without effusion, retraction or mass.  Eyes - Extraocular movements intact, and the pupils were reactive to light. Sclera were not icteric or injected, conjunctiva were pink and moist.  Mouth - Examination of the oral cavity showed pink, healthy oral mucosa. Dentition in good condition. No lesions or ulcerations noted. The tongue was mobile and midline.   Throat - The walls of the oropharynx were smooth, pink, moist, symmetric, and had no lesions or ulcerations.  The tonsillar pillars and soft palate were symmetric. The uvula was midline on elevation.    Neck - Normal midline excursion of the laryngotracheal complex during swallowing.  Full range of motion on passive movement.  Palpation of the occipital, submental, submandibular, internal jugular chain, and supraclavicular nodes did not demonstrate any abnormal lymph nodes or masses.  Palpation of the thyroid was soft and smooth, with no nodules or goiter appreciated.  The trachea was mobile and midline.  Nose - External contour is symmetric, no gross deflection or scars.  Nasal mucosa is pink and moist with no abnormal mucus.  The septum and turbinates were evaluated with nasal speculum, no polyps, masses, or purulence noted on examination of anterior nasal cavity.         Assessment and Plan:       ICD-10-CM    1. Perennial allergic rhinitis  J30.89            Again, thank you for allowing me to participate in the care of your patient.        Sincerely,        Trisha Francisco NP

## 2021-11-17 ENCOUNTER — ALLIED HEALTH/NURSE VISIT (OUTPATIENT)
Dept: ALLERGY | Facility: OTHER | Age: 46
End: 2021-11-17
Attending: NURSE PRACTITIONER
Payer: COMMERCIAL

## 2021-11-17 DIAGNOSIS — J30.89 PERENNIAL ALLERGIC RHINITIS: Primary | ICD-10-CM

## 2021-11-17 PROCEDURE — 95165 ANTIGEN THERAPY SERVICES: CPT | Performed by: NURSE PRACTITIONER

## 2021-11-17 NOTE — PROGRESS NOTES
Allergy serum is mixed today at Green schedule 4 of 4,  into  1  (5 ml)  multi dose vial/vials.    Allergens included were:    Ragweed  0.2 ml of dilution # 1  Pigweed  0.2 ml of dilution # 1  Mugwort 0.2 ml of dilution # 0  Kochia  0.2 ml of dilution # 0  Russian Thistle 0.2 ml of dilution # 1  Que Grass 0.2 ml of dilution # 1  Birch mix 0.2 ml of dilution # 1  Maple Mix 0.2 of dilution # 1  Elm Mix 0.2 ml of dilution # 1  Oak Mix 0.2 ml of dilution # 1  Moe Mix 0.2 ml of dilution # 1  Pine Mix 0.2 ml of dilution # 1  Eastern [place] 0.2 ml of dilution # 0  Black [place] 0.2 ml of dilution # 1  Aspen 0.2 ml of dilution # 0  Red [place] 0.2 ml of dilution # 0    Alternaria 0.2 ml of dilution # 1  Aspergillus 0.2 ml of dilution # 1  Hormodendrum 0.2 ml of dilution # 1  Helminthosporium 0.2 ml of dilution # 1  Penicillium 0.2 ml of dilution # 0  Epicoccum 0.2 ml of dilution # 1  Fusarium 0.2 ml of dilution # 1  Mucor 0.2 ml of dilution # 0  Grain Smut 0.2 ml of dilution # 0  Grass Smut 0.2 ml of dilution # 0  Cat 0.2 ml of dilution # 1  Dog 0.2 ml of dilution # 1  Feather Mix 0.2 ml of dilution # 0  Dust Mite Mix 0.2 ml of dilution # 1  Horse 0.2 ml of dilution # 0    Hernando Vanegas RN on 11/17/2021 at 1:45 PM

## 2021-11-23 ENCOUNTER — OFFICE VISIT (OUTPATIENT)
Dept: ALLERGY | Facility: OTHER | Age: 46
End: 2021-11-23
Attending: PHYSICIAN ASSISTANT
Payer: COMMERCIAL

## 2021-11-23 DIAGNOSIS — J30.89 PERENNIAL ALLERGIC RHINITIS: Primary | ICD-10-CM

## 2021-11-23 PROCEDURE — 95115 IMMUNOTHERAPY ONE INJECTION: CPT

## 2021-11-23 NOTE — PROGRESS NOTES
Prior to injection patient identity verified using name and date of birth.    SVT done on left arm, measuring 9 mm.  Passed.  Documented on paper flowsheet.     Allergy injection/s given and charted on paper allergy flow sheet.  Patient experienced no reaction after 30 minutes.     Hernando Vanegas RN on 11/23/2021 at 2:25 PM

## 2022-01-24 DIAGNOSIS — J30.89 PERENNIAL ALLERGIC RHINITIS: Primary | ICD-10-CM

## 2022-01-24 NOTE — PROGRESS NOTES
Patient needs an updated order for allergy injections. Please review order and sig, thank you!    Hernando Vanegas RN on 1/24/2022 at 3:33 PM

## 2022-01-26 ENCOUNTER — ALLIED HEALTH/NURSE VISIT (OUTPATIENT)
Dept: ALLERGY | Facility: OTHER | Age: 47
End: 2022-01-26
Attending: NURSE PRACTITIONER
Payer: COMMERCIAL

## 2022-01-26 DIAGNOSIS — J30.89 PERENNIAL ALLERGIC RHINITIS: Primary | ICD-10-CM

## 2022-01-26 PROCEDURE — 95165 ANTIGEN THERAPY SERVICES: CPT | Performed by: NURSE PRACTITIONER

## 2022-01-26 NOTE — PROGRESS NOTES
Allergy serum is mixed today at [facility],  into  1  (5 ml)  multi dose vial/vials.    Allergens included were:    Ragweed  0.2 ml of dilution # 1  Pigweed  0.2 ml of dilution # 1  Mugwort 0.2 ml of dilution # 0  Kochia  0.2 ml of dilution # 0  Russian Thistle 0.2 ml of dilution # 1  Que Grass 0.2 ml of dilution # 1  Birch mix 0.2 ml of dilution # 1  Maple Mix 0.2 of dilution # 1  Elm Mix 0.2 ml of dilution # 1  Oak Mix 0.2 ml of dilution # 1  Moe Mix 0.2 ml of dilution # 1  Pine Mix 0.2 ml of dilution # 1  Eastern [place] 0.2 ml of dilution # 0  Black [place] 0.2 ml of dilution # 1  Aspen 0.2 ml of dilution # 0  Red [place] 0.2 ml of dilution # 0    Alternaria 0.2 ml of dilution # 1  Aspergillus 0.2 ml of dilution # 1  Hormodendrum 0.2 ml of dilution # 1  Helminthosporium 0.2 ml of dilution # 1  Penicillium 0.2 ml of dilution # 0  Epicoccum 0.2 ml of dilution # 1  Fusarium 0.2 ml of dilution # 1  Mucor 0.2 ml of dilution # 0  Grain Smut 0.2 ml of dilution # 0  Grass Smut 0.2 ml of dilution # 0  Cat 0.2 ml of dilution # 1  Dog 0.2 ml of dilution # 1  Feather Mix 0.2 ml of dilution # 0  Dust Mite Mix 0.2 ml of dilution # 1  Horse 0.2 ml of dilution # 0    Hernando Vanegas RN on 1/26/2022 at 2:20 PM

## 2022-02-15 ENCOUNTER — ALLIED HEALTH/NURSE VISIT (OUTPATIENT)
Dept: ALLERGY | Facility: OTHER | Age: 47
End: 2022-02-15
Attending: NURSE PRACTITIONER
Payer: COMMERCIAL

## 2022-02-15 DIAGNOSIS — J30.89 PERENNIAL ALLERGIC RHINITIS: Primary | ICD-10-CM

## 2022-02-15 PROCEDURE — 95115 IMMUNOTHERAPY ONE INJECTION: CPT

## 2022-02-15 NOTE — PROGRESS NOTES
Prior to injection patient identity verified using name and date of birth.    SVT done on left arm, measuring 8 mm.  Passed.  Documented on paper flowsheet.     Allergy injection/s given and charted on paper allergy flow sheet.  Patient held 30 minutes,  No reaction.    Hernando Vanegas RN on 2/15/2022 at 11:38 AM

## 2022-04-27 ENCOUNTER — ALLIED HEALTH/NURSE VISIT (OUTPATIENT)
Dept: ALLERGY | Facility: OTHER | Age: 47
End: 2022-04-27
Attending: NURSE PRACTITIONER
Payer: COMMERCIAL

## 2022-04-27 DIAGNOSIS — J30.89 PERENNIAL ALLERGIC RHINITIS: Primary | ICD-10-CM

## 2022-04-27 PROCEDURE — 95165 ANTIGEN THERAPY SERVICES: CPT | Performed by: NURSE PRACTITIONER

## 2022-04-27 NOTE — PROGRESS NOTES
Allergy serum is mixed today at [facility],  into  1  (5 ml)  multi dose vial/vials.    Allergens included were:    Ragweed  0.2 ml of dilution # 1  Pigweed  0.2 ml of dilution # 1  Mugwort 0.2 ml of dilution # 0  Kochia  0.2 ml of dilution # 0  Russian Thistle 0.2 ml of dilution # 1  Que Grass 0.2 ml of dilution # 1  Birch mix 0.2 ml of dilution # 1  Maple Mix 0.2 of dilution # 1  Elm Mix 0.2 ml of dilution # 1  Oak Mix 0.2 ml of dilution # 1  Moe Mix 0.2 ml of dilution # 1  Pine Mix 0.2 ml of dilution # 1  Eastern [place] 0.2 ml of dilution # 0  Black [place] 0.2 ml of dilution # 1  Aspen 0.2 ml of dilution # 0  Red [place] 0.2 ml of dilution # 0    Alternaria 0.2 ml of dilution # 1  Aspergillus 0.2 ml of dilution # 1  Hormodendrum 0.2 ml of dilution # 1  Helminthosporium 0.2 ml of dilution # 1  Penicillium 0.2 ml of dilution # 0  Epicoccum 0.2 ml of dilution # 1  Fusarium 0.2 ml of dilution # 1  Mucor 0.2 ml of dilution # 0  Grain Smut 0.2 ml of dilution # 0  Grass Smut 0.2 ml of dilution # 0  Cat 0.2 ml of dilution # 1  Dog 0.2 ml of dilution # 1  Feather Mix 0.2 ml of dilution # 0  Dust Mite Mix 0.2 ml of dilution # 1  Horse 0.2 ml of dilution # 0    Hernando Vanegas RN on 4/27/2022 at 11:54 AM

## 2022-05-06 ENCOUNTER — ALLIED HEALTH/NURSE VISIT (OUTPATIENT)
Dept: ALLERGY | Facility: OTHER | Age: 47
End: 2022-05-06
Attending: NURSE PRACTITIONER
Payer: COMMERCIAL

## 2022-05-06 DIAGNOSIS — J30.89 PERENNIAL ALLERGIC RHINITIS: Primary | ICD-10-CM

## 2022-05-06 PROCEDURE — 95115 IMMUNOTHERAPY ONE INJECTION: CPT

## 2022-05-06 NOTE — PROGRESS NOTES
Prior to injection patient identity verified using name and date of birth.    SVT done on right arm, measuring 9 mm.  Passed  Documented on paper flowsheet.     Allergy injection/s given and charted on paper allergy flow sheet.  Patient left AMA, signing form, not staying for the observation period.      Hernando Vanegas RN on 5/6/2022 at 11:27 AM      
negative...

## 2022-06-29 ENCOUNTER — ALLIED HEALTH/NURSE VISIT (OUTPATIENT)
Dept: ALLERGY | Facility: OTHER | Age: 47
End: 2022-06-29
Attending: NURSE PRACTITIONER
Payer: COMMERCIAL

## 2022-06-29 DIAGNOSIS — J30.89 PERENNIAL ALLERGIC RHINITIS: Primary | ICD-10-CM

## 2022-06-29 PROCEDURE — 95165 ANTIGEN THERAPY SERVICES: CPT | Performed by: NURSE PRACTITIONER

## 2022-06-29 NOTE — PROGRESS NOTES
Allergy serum is mixed today at [facility],  into  1  (5 ml)  multi dose vial/vials.    Allergens included were:    Ragweed  0.2 ml of dilution # 1  Pigweed  0.2 ml of dilution # 1  Mugwort 0.2 ml of dilution # 0  Kochia  0.2 ml of dilution # 0  Russian Thistle 0.2 ml of dilution # 1  Que Grass 0.2 ml of dilution # 1  Birch mix 0.2 ml of dilution # 1  Maple Mix 0.2 of dilution # 1  Elm Mix 0.2 ml of dilution # 1  Oak Mix 0.2 ml of dilution # 1  Moe Mix 0.2 ml of dilution # 1  Pine Mix 0.2 ml of dilution # 1  Eastern [place] 0.2 ml of dilution # 0  Black [place] 0.2 ml of dilution # 1  Aspen 0.2 ml of dilution # 0  Red [place] 0.2 ml of dilution # 0    Alternaria 0.2 ml of dilution # 1  Aspergillus 0.2 ml of dilution # 1  Hormodendrum 0.2 ml of dilution # 1  Helminthosporium 0.2 ml of dilution # 1  Penicillium 0.2 ml of dilution # 0  Epicoccum 0.2 ml of dilution # 1  Fusarium 0.2 ml of dilution # 1  Mucor 0.2 ml of dilution # 0  Grain Smut 0.2 ml of dilution # 0  Grass Smut 0.2 ml of dilution # 0  Cat 0.2 ml of dilution # 1  Dog 0.2 ml of dilution # 1  Feather Mix 0.2 ml of dilution # 0  Dust Mite Mix 0.2 ml of dilution # 1  Horse 0.2 ml of dilution # 0    Hernando Loyola RN on 6/29/2022 at 10:30 AM

## 2022-07-13 ENCOUNTER — ALLIED HEALTH/NURSE VISIT (OUTPATIENT)
Dept: ALLERGY | Facility: OTHER | Age: 47
End: 2022-07-13
Attending: PHYSICIAN ASSISTANT
Payer: COMMERCIAL

## 2022-07-13 DIAGNOSIS — J30.89 PERENNIAL ALLERGIC RHINITIS: Primary | ICD-10-CM

## 2022-07-13 PROCEDURE — 95024 IQ TESTS W/ALLERGENIC XTRCS: CPT

## 2022-07-13 NOTE — PROGRESS NOTES
Prior to injection patient identity verified using name and date of birth.    SVT done on left arm, measuring 7 mm.  Passed  Documented on paper flowsheet.     SVT completed only, the pateient forgot her Epi Pen, so was unable to give her allergy injection today.  She will schedule her injection at [place].    Hernando Loyola RN on 7/13/2022 at 8:18 AM

## 2022-09-21 ENCOUNTER — ALLIED HEALTH/NURSE VISIT (OUTPATIENT)
Dept: ALLERGY | Facility: OTHER | Age: 47
End: 2022-09-21
Attending: NURSE PRACTITIONER
Payer: COMMERCIAL

## 2022-09-21 DIAGNOSIS — J30.89 PERENNIAL ALLERGIC RHINITIS: Primary | ICD-10-CM

## 2022-09-21 PROCEDURE — 95165 ANTIGEN THERAPY SERVICES: CPT | Performed by: NURSE PRACTITIONER

## 2022-09-21 NOTE — PROGRESS NOTES
Allergy serum is mixed today at [facility] into 1 (5 ml)  multi dose vial/vials.    Allergens included were:    Ragweed  0.2 ml of dilution # 1  Pigweed  0.2 ml of dilution # 1  Mugwort 0.2 ml of dilution # 0  Kochia  0.2 ml of dilution # 0  Russian Thistle 0.2 ml of dilution # 1  Que Grass 0.2 ml of dilution # 1  Birch mix 0.2 ml of dilution # 1  Maple Mix 0.2 of dilution # 1  Elm Mix 0.2 ml of dilution # 1  Oak Mix 0.2 ml of dilution # 1  Moe Mix 0.2 ml of dilution # 1  Pine Mix 0.2 ml of dilution # 1  Eastern [place] 0.2 ml of dilution # 0  Black [place] 0.2 ml of dilution # 1  Aspen 0.2 ml of dilution # 0  Red [place] 0.2 ml of dilution # 0    Alternaria 0.2 ml of dilution # 1  Aspergillus 0.2 ml of dilution # 1  Hormodendrum 0.2 ml of dilution # 1  Helminthosporium 0.2 ml of dilution # 1  Penicillium 0.2 ml of dilution # 0  Epicoccum 0.2 ml of dilution # 1  Fusarium 0.2 ml of dilution # 1  Mucor 0.2 ml of dilution # 0  Grain Smut 0.2 ml of dilution # 0  Grass Smut 0.2 ml of dilution # 0  Cat 0.2 ml of dilution # 1  Dog 0.2 ml of dilution # 1  Feather Mix 0.2 ml of dilution # 0  Dust Mite Mix 0.2 ml of dilution # 1  Horse 0.2 ml of dilution # 0    May Ellis RN on 9/21/2022 at 9:57 AM

## 2022-09-23 ENCOUNTER — ALLIED HEALTH/NURSE VISIT (OUTPATIENT)
Dept: ALLERGY | Facility: OTHER | Age: 47
End: 2022-09-23
Attending: NURSE PRACTITIONER
Payer: COMMERCIAL

## 2022-09-23 DIAGNOSIS — J30.89 PERENNIAL ALLERGIC RHINITIS: Primary | ICD-10-CM

## 2022-09-23 PROCEDURE — 95115 IMMUNOTHERAPY ONE INJECTION: CPT

## 2022-09-23 NOTE — PROGRESS NOTES
Prior to injection patient identity verified using name and date of birth.    SVT done on left arm, measuring 8 mm.  Passed     Documented on paper flowsheet.     Allergy injection/s given and charted on paper allergy flow sheet.  Patient held 30 minutes,  No reaction noted.    Hernando Loyola RN on 9/23/2022 at 11:42 AM

## 2022-11-09 ENCOUNTER — ALLIED HEALTH/NURSE VISIT (OUTPATIENT)
Dept: ALLERGY | Facility: OTHER | Age: 47
End: 2022-11-09
Attending: NURSE PRACTITIONER
Payer: COMMERCIAL

## 2022-11-09 DIAGNOSIS — J30.89 PERENNIAL ALLERGIC RHINITIS: Primary | ICD-10-CM

## 2022-11-09 PROCEDURE — 95165 ANTIGEN THERAPY SERVICES: CPT | Performed by: NURSE PRACTITIONER

## 2022-11-09 NOTE — PROGRESS NOTES
Allergy serum is mixed today at [facility],  into  1  (5 ml)  multi dose vial/vials.    Allergens included were:    Ragweed  0.2 ml of dilution # 1  Pigweed  0.2 ml of dilution # 1  Mugwort 0.2 ml of dilution # 0  Kochia  0.2 ml of dilution # 0  Russian Thistle 0.2 ml of dilution # 1  Que Grass 0.2 ml of dilution # 1  Birch mix 0.2 ml of dilution # 1  Maple Mix 0.2 of dilution # 1  Elm Mix 0.2 ml of dilution # 1  Oak Mix 0.2 ml of dilution # 1  Moe Mix 0.2 ml of dilution # 1  Pine Mix 0.2 ml of dilution # 1  Eastern [place] 0.2 ml of dilution # 0  Black [place] 0.2 ml of dilution # 1  Aspen 0.2 ml of dilution # 0  Red [place] 0.2 ml of dilution # 0    Alternaria 0.2 ml of dilution # 1  Aspergillus 0.2 ml of dilution # 1  Hormodendrum 0.2 ml of dilution # 1  Helminthosporium 0.2 ml of dilution # 1  Penicillium 0.2 ml of dilution # 0  Epicoccum 0.2 ml of dilution # 1  Fusarium 0.2 ml of dilution # 1  Mucor 0.2 ml of dilution # 0  Grain Smut 0.2 ml of dilution # 0  Grass Smut 0.2 ml of dilution # 0  Cat 0.2 ml of dilution # 1  Dog 0.2 ml of dilution # 1  Feather Mix 0.2 ml of dilution # 0  Dust Mite Mix 0.2 ml of dilution # 1  Horse 0.2 ml of dilution # 0    Hernando Loyola RN on 11/9/2022 at 9:57 AM

## 2022-11-30 ENCOUNTER — OFFICE VISIT (OUTPATIENT)
Dept: ALLERGY | Facility: OTHER | Age: 47
End: 2022-11-30
Attending: NURSE PRACTITIONER
Payer: COMMERCIAL

## 2022-11-30 DIAGNOSIS — J30.89 PERENNIAL ALLERGIC RHINITIS: Primary | ICD-10-CM

## 2022-11-30 PROCEDURE — 95115 IMMUNOTHERAPY ONE INJECTION: CPT

## 2022-11-30 NOTE — PROGRESS NOTES
Prior to injection patient identity verified using name and date of birth.    SVT done on left arm, measuring 9 mm.  Passed   Documented on paper flowsheet.     Allergy injection/s given and charted on paper allergy flow sheet.  Patient held 30 minutes, no reaction.    Hernando Loyola RN on 11/30/2022 at 2:13 PM

## 2023-01-30 DIAGNOSIS — J30.89 PERENNIAL ALLERGIC RHINITIS: Primary | ICD-10-CM

## 2023-01-30 NOTE — PROGRESS NOTES
Patient needs an updated order to continue with her allergy injections.  New order pended.  Please review and sign, thank you!    Hernando Loyola RN on 1/30/2023 at 1:14 PM

## 2023-02-08 ENCOUNTER — ALLIED HEALTH/NURSE VISIT (OUTPATIENT)
Dept: ALLERGY | Facility: OTHER | Age: 48
End: 2023-02-08
Attending: NURSE PRACTITIONER
Payer: COMMERCIAL

## 2023-02-08 DIAGNOSIS — J30.89 PERENNIAL ALLERGIC RHINITIS: Primary | ICD-10-CM

## 2023-02-08 PROCEDURE — 95165 ANTIGEN THERAPY SERVICES: CPT | Performed by: NURSE PRACTITIONER

## 2023-02-08 NOTE — PROGRESS NOTES
Allergy serum is mixed today at [facility],  into  1  (5 ml)  multi dose vial/vials.    Allergens included were:    Ragweed  0.2 ml of dilution # 1  Pigweed  0.2 ml of dilution # 1  Mugwort 0.2 ml of dilution # 0  Kochia  0.2 ml of dilution # 0  Russian Thistle 0.2 ml of dilution # 1  Que Grass 0.2 ml of dilution # 1  Birch mix 0.2 ml of dilution # 1  Maple Mix 0.2 of dilution # 1  Elm Mix 0.2 ml of dilution # 1  Oak Mix 0.2 ml of dilution # 1  Moe Mix 0.2 ml of dilution # 1  Pine Mix 0.2 ml of dilution # 1  Eastern [place] 0.2 ml of dilution # 0  Black [place] 0.2 ml of dilution # 1  Aspen 0.2 ml of dilution # 0  Red [place] 0.2 ml of dilution # 0    Alternaria 0.2 ml of dilution # 1  Aspergillus 0.2 ml of dilution # 1  Hormodendrum 0.2 ml of dilution # 1  Helminthosporium 0.2 ml of dilution # 1  Penicillium 0.2 ml of dilution # 0  Epicoccum 0.2 ml of dilution # 1  Fusarium 0.2 ml of dilution # 1  Mucor 0.2 ml of dilution # 0  Grain Smut 0.2 ml of dilution # 0  Grass Smut 0.2 ml of dilution # 0  Cat 0.2 ml of dilution # 1  Dog 0.2 ml of dilution # 1  Feather Mix 0.2 ml of dilution # 0  Dust Mite Mix 0.2 ml of dilution # 1  Horse 0.2 ml of dilution # 0    Hernando Loyola RN on 2/8/2023 at 10:00 AM

## 2023-02-13 ENCOUNTER — OFFICE VISIT (OUTPATIENT)
Dept: OTOLARYNGOLOGY | Facility: OTHER | Age: 48
End: 2023-02-13
Attending: NURSE PRACTITIONER
Payer: COMMERCIAL

## 2023-02-13 ENCOUNTER — OFFICE VISIT (OUTPATIENT)
Dept: ALLERGY | Facility: OTHER | Age: 48
End: 2023-02-13
Attending: NURSE PRACTITIONER
Payer: COMMERCIAL

## 2023-02-13 VITALS
SYSTOLIC BLOOD PRESSURE: 119 MMHG | HEART RATE: 61 BPM | WEIGHT: 215 LBS | BODY MASS INDEX: 39.56 KG/M2 | HEIGHT: 62 IN | TEMPERATURE: 97.1 F | DIASTOLIC BLOOD PRESSURE: 72 MMHG | OXYGEN SATURATION: 98 % | RESPIRATION RATE: 15 BRPM

## 2023-02-13 DIAGNOSIS — J30.89 PERENNIAL ALLERGIC RHINITIS: Primary | ICD-10-CM

## 2023-02-13 DIAGNOSIS — H93.8X3 SENSATION OF PLUGGED EAR ON BOTH SIDES: ICD-10-CM

## 2023-02-13 PROCEDURE — 99213 OFFICE O/P EST LOW 20 MIN: CPT | Mod: 25 | Performed by: NURSE PRACTITIONER

## 2023-02-13 PROCEDURE — 95115 IMMUNOTHERAPY ONE INJECTION: CPT

## 2023-02-13 ASSESSMENT — PAIN SCALES - GENERAL: PAINLEVEL: NO PAIN (0)

## 2023-02-13 NOTE — PROGRESS NOTES
Prior to injection patient identity verified using name and date of birth.    SVT done on right arm, measuring 7 mm.  Passed.  Documented on paper flowsheet.     Allergy injection/s given and charted on paper allergy flow sheet.  Patient held 30 minutes, area of redness around injection, small dime-sized area of firmness. No reaction. Patient stated her arm felt good.     May Ellis RN on 2/13/2023 at 2:00 PM

## 2023-02-13 NOTE — LETTER
2/13/2023         RE: Carlee Jones  [address]  [place] [address]        Dear Colleague,    Thank you for referring your patient, Carlee Jones, to the [facility]. Please see a copy of my visit note below.    Chief Complaint   Patient presents with     Follow Up     Patient requested, follow up allergy        Carlee Jones presents for an annual allergy follow up appointment   She reached every other week maintenance injections on 1/31/23  Has been doing well with injections.   Has noticed improvement with nasal congestion and overall allergy   Currently only on zyrtec and tolerating well.  Allergies have been well controlled and she notes that symptoms are typically well controlled if she misses a few days.           No liset sinusitis  No recent illnesses or sinus infections  She does note an intermittent sensation of plugged ears.  No c/o hearing or bothersome tinnitus, vertigo, flux hearing.   She does have motion sickness that has been present for many years, seems to have worsened over the last couple of years.  She uses Dramamine as needed with improvement.    Epi pen is up to date  Has an upcoming appointment - will only be 13 days out from last injection, ok to give injection.     Allergies   Allergen Reactions     Cats      Dog Epithelium      Other reaction(s): sneezing, watering nose     Gramicidin      Neomycin      Neosporin [Neomycin-Polymyx-Gramicid]      Penicillins      Polymyxin B      Sulfa Drugs      Current Outpatient Medications   Medication     cetirizine (ZYRTEC) 10 MG tablet     cholecalciferol (VITAMIN D3) 125 mcg (5000 units) capsule     EPINEPHrine (ANY BX GENERIC EQUIV) 0.3 MG/0.3ML injection 2-pack     escitalopram (LEXAPRO) 20 MG tablet     fish oil-omega-3 fatty acids 1000 MG capsule     Flaxseed, Linseed, (FLAX SEED OIL) 1000 MG capsule     lactobacillus rhamnosus, GG, (CULTURELL) capsule     levothyroxine (SYNTHROID/LEVOTHROID) 25 MCG tablet  "    multivitamin, therapeutic (THERA-VIT) TABS tablet     ORDER FOR ALLERGEN IMMUNOTHERAPY     No current facility-administered medications for this visit.     No past medical history on file.  Past Surgical History:   Procedure Laterality Date     HERNIA REPAIR Right 2021     HYSTERECTOMY VAGINAL, SALPINGECTOMY  2019    Performed at [facility] for abnormal uterine bleeding.     HYSTERECTOMY, PAP NO LONGER INDICATED N/A 2019    Cervix removed per Hyst path report. Oavaries remain.     Social History     Tobacco Use     Smoking status: Former     Packs/day: 2.00     Types: Cigarettes     Start date: 1986     Quit date: 8/3/1998     Years since quittin.5     Smokeless tobacco: Never   Substance Use Topics     Alcohol use: Yes     Comment: social     Drug use: Never       /72 (BP Location: Left arm, Cuff Size: Adult Large)   Pulse 61   Temp 97.1  F (36.2  C) (Tympanic)   Resp 15   Ht 1.575 m (5' 2\")   Wt 97.5 kg (215 lb)   SpO2 98%   BMI 39.32 kg/m      General - The patient is well nourished and well developed.  Alert and oriented to person and place, answers questions and cooperates with examination appropriately.   Head and Face - Normocephalic and atraumatic, with no gross asymmetry noted.  The facial nerve is intact, with strong symmetric movements.  Voice and Breathing - The patient was breathing comfortably without the use of accessory muscles. There was no wheezing, stridor, or stertor.  The patients voice was clear and strong, and had appropriate pitch and quality.  Ears - External ears are normal in appearance. The external auditory canals are patent, the tympanic membranes are intact without effusion, retraction or mass.  Bony landmarks are intact.  Eyes - Extraocular movements intact.  Conjunctiva were not injected.  Mouth - Examination of the oral cavity showed pink, healthy oral mucosa. No lesions or ulcerations noted.  The tongue was mobile and " midline, and the dentition were in good condition.    Throat - The walls of the oropharynx were smooth, pink, moist, symmetric, and had no lesions or ulcerations.  The tonsillar pillars and soft palate were symmetric.  The uvula was midline on elevation.   Neck -  Palpation of the occipital, submental, submandibular, internal jugular chain, and supraclavicular nodes did not demonstrate any abnormal lymph nodes or masses.  Palpation of the thyroid was soft and smooth, with no nodules or goiter appreciated.  The trachea was mobile and midline.  Nose - External contour is symmetric, no gross deflection or scars.  Nasal mucosa is pink and moist with no abnormal mucus.  The septum was intact, turbinates of normal size and position.  No polyps, masses, or purulence noted on examination.    ASSESSMENT:      ICD-10-CM    1. Perennial allergic rhinitis  J30.89       2. Sensation of plugged ear on both sides  H93.8X3 Adult Audiology  Referral        Complete audiogram  Continue every other week injections  Continue antihistamine, may wean as able  Follow up in 1 year  Continue dramamine for motion sickness as needed  If you have a long road trip, we can try scopolamine patch    Trisha Francisco NP-C  1:35 PM  February 13, 2023        Again, thank you for allowing me to participate in the care of your patient.        Sincerely,        Trisha Francisco NP

## 2023-02-13 NOTE — PATIENT INSTRUCTIONS
Thank you for allowing Trisha Francisco NP and our ENT team to participate in your care.  If your medications are too expensive, please give the nurse a call.  We can possibly change this medication.  If you have a scheduling or an appointment question please contact our Health Unit Coordinator at their direct line [phone].  ALL nursing questions or concerns can be directed to your ENT Nurse: [phone]--[place]     Continue every other week injections  Continue antihistamine, may wean as able  Follow up in 1 year  Continue dramamine for motion sickness as needed  If you have a long road trip, we can try scopolamine patch

## 2023-02-13 NOTE — PROGRESS NOTES
Chief Complaint   Patient presents with     Follow Up     Patient requested, follow up allergy        Carlee Jones presents for an annual allergy follow up appointment   She reached every other week maintenance injections on 1/31/23  Has been doing well with injections.   Has noticed improvement with nasal congestion and overall allergy   Currently only on zyrtec and tolerating well.  Allergies have been well controlled and she notes that symptoms are typically well controlled if she misses a few days.           No liset sinusitis  No recent illnesses or sinus infections  She does note an intermittent sensation of plugged ears.  No c/o hearing or bothersome tinnitus, vertigo, flux hearing.   She does have motion sickness that has been present for many years, seems to have worsened over the last couple of years.  She uses Dramamine as needed with improvement.    Epi pen is up to date  Has an upcoming appointment - will only be 13 days out from last injection, ok to give injection.     Allergies   Allergen Reactions     Cats      Dog Epithelium      Other reaction(s): sneezing, watering nose     Gramicidin      Neomycin      Neosporin [Neomycin-Polymyx-Gramicid]      Penicillins      Polymyxin B      Sulfa Drugs      Current Outpatient Medications   Medication     cetirizine (ZYRTEC) 10 MG tablet     cholecalciferol (VITAMIN D3) 125 mcg (5000 units) capsule     EPINEPHrine (ANY BX GENERIC EQUIV) 0.3 MG/0.3ML injection 2-pack     escitalopram (LEXAPRO) 20 MG tablet     fish oil-omega-3 fatty acids 1000 MG capsule     Flaxseed, Linseed, (FLAX SEED OIL) 1000 MG capsule     lactobacillus rhamnosus, GG, (CULTURELL) capsule     levothyroxine (SYNTHROID/LEVOTHROID) 25 MCG tablet     multivitamin, therapeutic (THERA-VIT) TABS tablet     ORDER FOR ALLERGEN IMMUNOTHERAPY     No current facility-administered medications for this visit.     No past medical history on file.  Past Surgical History:   Procedure Laterality Date      "HERNIA REPAIR Right 2021     HYSTERECTOMY VAGINAL, SALPINGECTOMY  2019    Performed at [facility] for abnormal uterine bleeding.     HYSTERECTOMY, PAP NO LONGER INDICATED N/A 2019    Cervix removed per Hyst path report. Oavaries remain.     Social History     Tobacco Use     Smoking status: Former     Packs/day: 2.00     Types: Cigarettes     Start date: 1986     Quit date: 8/3/1998     Years since quittin.5     Smokeless tobacco: Never   Substance Use Topics     Alcohol use: Yes     Comment: social     Drug use: Never       /72 (BP Location: Left arm, Cuff Size: Adult Large)   Pulse 61   Temp 97.1  F (36.2  C) (Tympanic)   Resp 15   Ht 1.575 m (5' 2\")   Wt 97.5 kg (215 lb)   SpO2 98%   BMI 39.32 kg/m      General - The patient is well nourished and well developed.  Alert and oriented to person and place, answers questions and cooperates with examination appropriately.   Head and Face - Normocephalic and atraumatic, with no gross asymmetry noted.  The facial nerve is intact, with strong symmetric movements.  Voice and Breathing - The patient was breathing comfortably without the use of accessory muscles. There was no wheezing, stridor, or stertor.  The patients voice was clear and strong, and had appropriate pitch and quality.  Ears - External ears are normal in appearance. The external auditory canals are patent, the tympanic membranes are intact without effusion, retraction or mass.  Bony landmarks are intact.  Eyes - Extraocular movements intact.  Conjunctiva were not injected.  Mouth - Examination of the oral cavity showed pink, healthy oral mucosa. No lesions or ulcerations noted.  The tongue was mobile and midline, and the dentition were in good condition.    Throat - The walls of the oropharynx were smooth, pink, moist, symmetric, and had no lesions or ulcerations.  The tonsillar pillars and soft palate were symmetric.  The uvula was midline on " elevation.   Neck -  Palpation of the occipital, submental, submandibular, internal jugular chain, and supraclavicular nodes did not demonstrate any abnormal lymph nodes or masses.  Palpation of the thyroid was soft and smooth, with no nodules or goiter appreciated.  The trachea was mobile and midline.  Nose - External contour is symmetric, no gross deflection or scars.  Nasal mucosa is pink and moist with no abnormal mucus.  The septum was intact, turbinates of normal size and position.  No polyps, masses, or purulence noted on examination.    ASSESSMENT:      ICD-10-CM    1. Perennial allergic rhinitis  J30.89       2. Sensation of plugged ear on both sides  H93.8X3 Adult Audiology  Referral        Complete audiogram  Continue every other week injections  Continue antihistamine, may wean as able  Follow up in 1 year  Continue dramamine for motion sickness as needed  If you have a long road trip, we can try scopolamine patch    Trisha Francisco NP-C  1:35 PM  February 13, 2023

## 2023-05-31 ENCOUNTER — ALLIED HEALTH/NURSE VISIT (OUTPATIENT)
Dept: ALLERGY | Facility: OTHER | Age: 48
End: 2023-05-31
Attending: NURSE PRACTITIONER

## 2023-05-31 DIAGNOSIS — J30.89 PERENNIAL ALLERGIC RHINITIS: Primary | ICD-10-CM

## 2023-05-31 PROCEDURE — 95165 ANTIGEN THERAPY SERVICES: CPT | Performed by: NURSE PRACTITIONER

## 2023-05-31 NOTE — PROGRESS NOTES
Allergy serum is mixed today at [facility],  into  1  (5 ml)  multi dose vial/vials.    Allergens included were:    Ragweed  0.2 ml of dilution # 1  Pigweed  0.2 ml of dilution # 1  Mugwort 0.2 ml of dilution # 0  Kochia  0.2 ml of dilution # 0  Russian Thistle 0.2 ml of dilution # 1  Que [place] 0.2 ml of dilution # 1  Birch mix 0.2 ml of dilution # 1  Maple Mix 0.2 of dilution # 1  Elm Mix 0.2 ml of dilution # 1  Oak Mix 0.2 ml of dilution # 1  Moe Mix 0.2 ml of dilution # 1  Pine Mix 0.2 ml of dilution # 1  Eastern [place] 0.2 ml of dilution # 0  Black [place] 0.2 ml of dilution # 1  Aspen 0.2 ml of dilution # 0  Red [place] 0.2 ml of dilution # 0    Alternaria 0.2 ml of dilution # 1  Aspergillus 0.2 ml of dilution # 1  Hormodendrum 0.2 ml of dilution # 1  Helminthosporium 0.2 ml of dilution # 1  Penicillium 0.2 ml of dilution # 0  Epicoccum 0.2 ml of dilution # 1  Fusarium 0.2 ml of dilution # 1  Mucor 0.2 ml of dilution # 0  Grain Smut 0.2 ml of dilution # 0  Grass Smut 0.2 ml of dilution # 0  Cat 0.2 ml of dilution # 1  Dog 0.2 ml of dilution # 1  Feather Mix 0.2 ml of dilution # 0  Dust Mite Mix 0.2 ml of dilution # 1  Horse 0.2 ml of dilution # 0    May Ellis RN on 5/31/2023 at 9:45 AM

## 2023-09-23 ENCOUNTER — TRANSFERRED RECORDS (OUTPATIENT)
Dept: ALLERGY | Facility: OTHER | Age: 48
End: 2023-09-23